# Patient Record
Sex: FEMALE
[De-identification: names, ages, dates, MRNs, and addresses within clinical notes are randomized per-mention and may not be internally consistent; named-entity substitution may affect disease eponyms.]

---

## 2019-04-22 ENCOUNTER — HOSPITAL ENCOUNTER (EMERGENCY)
Dept: HOSPITAL 14 - H.ER | Age: 69
Discharge: HOME | End: 2019-04-22
Payer: MEDICAID

## 2019-04-22 VITALS — TEMPERATURE: 98.6 F

## 2019-04-22 VITALS — DIASTOLIC BLOOD PRESSURE: 75 MMHG | SYSTOLIC BLOOD PRESSURE: 124 MMHG | RESPIRATION RATE: 18 BRPM

## 2019-04-22 VITALS — BODY MASS INDEX: 21.9 KG/M2

## 2019-04-22 DIAGNOSIS — M79.662: Primary | ICD-10-CM

## 2019-04-22 LAB
ALBUMIN SERPL-MCNC: 5 G/DL (ref 3.5–5)
ALBUMIN/GLOB SERPL: 1.1 {RATIO} (ref 1–2.1)
ALT SERPL-CCNC: 17 U/L (ref 9–52)
AST SERPL-CCNC: 29 U/L (ref 14–36)
BASOPHILS # BLD AUTO: 0.1 K/UL (ref 0–0.2)
BASOPHILS NFR BLD: 0.7 % (ref 0–2)
BUN SERPL-MCNC: 15 MG/DL (ref 7–17)
CALCIUM SERPL-MCNC: 10.2 MG/DL (ref 8.4–10.2)
EOSINOPHIL # BLD AUTO: 0 K/UL (ref 0–0.7)
EOSINOPHIL NFR BLD: 0.3 % (ref 0–4)
ERYTHROCYTE [DISTWIDTH] IN BLOOD BY AUTOMATED COUNT: 15.8 % (ref 11.5–14.5)
GFR NON-AFRICAN AMERICAN: > 60
HGB BLD-MCNC: 13.1 G/DL (ref 12–16)
LYMPHOCYTES # BLD AUTO: 2.1 K/UL (ref 1–4.3)
LYMPHOCYTES NFR BLD AUTO: 26.4 % (ref 20–40)
MCH RBC QN AUTO: 27.7 PG (ref 27–31)
MCHC RBC AUTO-ENTMCNC: 33.4 G/DL (ref 33–37)
MCV RBC AUTO: 82.9 FL (ref 81–99)
MONOCYTES # BLD: 0.4 K/UL (ref 0–0.8)
MONOCYTES NFR BLD: 5.2 % (ref 0–10)
NEUTROPHILS # BLD: 5.4 K/UL (ref 1.8–7)
NEUTROPHILS NFR BLD AUTO: 67.4 % (ref 50–75)
NRBC BLD AUTO-RTO: 0.2 % (ref 0–0)
PLATELET # BLD: 277 K/UL (ref 130–400)
PMV BLD AUTO: 8.2 FL (ref 7.2–11.7)
RBC # BLD AUTO: 4.72 MIL/UL (ref 3.8–5.2)
WBC # BLD AUTO: 8 K/UL (ref 4.8–10.8)

## 2019-04-22 NOTE — ED PDOC
Lower Extremity Pain/Injury


Time Seen by Provider: 19 11:00


Chief Complaint (Nursing): Lower Extremity Problem/Injury


Chief Complaint (Provider): Left leg pain


History Per: Patient,  (NAM [id number])


History/Exam Limitations: no limitations


Onset/Duration Of Symptoms: Days (3)


Current Symptoms Are (Timing): Still Present


Additional History Per: Patient


Additional Complaint(s): 


68yo female, otherwise well, comes to ER reporting worsening left lower leg pain

for the past 3 days. She states the pain was initially like a cramp but has 

progressively worsened; she took Tylenol at home with minimal relief. She denies

any fall, trauma, rash, fever, chills, chest pain or shortness of breath. 

Patient also denies any long travels or history of DVT. She states she does not 

follow up with a doctor - has one but hasnt followed up. no PE risk factors.





Past Medical History


Reviewed: Historical Data, Nursing Documentation, Vital Signs


Vital Signs: 





                                Last Vital Signs











Temp  98.6 F   19 10:59


 


Pulse  113 H  19 11:15


 


Resp  20   19 10:59


 


BP  188/91 H  19 10:59


 


Pulse Ox  98   19 10:59














- Medical History


PMH: No Chronic Diseases





- Surgical History


Surgical History: No Surg Hx





- Family History


Family History: States: No Known Family Hx





- Social History


Current smoker - smoking cessation education provided: No


Alcohol: None


Drugs: Denies





- Allergies


Allergies/Adverse Reactions: 


                                    Allergies











Allergy/AdvReac Type Severity Reaction Status Date / Time


 


No Known Allergies Allergy   Verified 19 11:12














Review of Systems


ROS Statement: Except As Marked, All Systems Reviewed And Found Negative


Constitutional: Negative for: Fever, Chills


Cardiovascular: Negative for: Chest Pain


Respiratory: Negative for: Shortness of Breath


Musculoskeletal: Positive for: Leg Pain


Neurological: Negative for: Weakness, Numbness





Physical Exam





- Reviewed


Nursing Documentation Reviewed: Yes


Vital Signs Reviewed: Yes





- Physical Exam


Appears: Positive for: Non-toxic, No Acute Distress


Head Exam: Positive for: ATRAUMATIC, NORMAL INSPECTION, NORMOCEPHALIC


Skin: Positive for: Normal Color


Eye Exam: Positive for: Normal appearance, EOMI, PERRL


ENT: Positive for: Normal ENT Inspection


Neck: Positive for: Supple


Cardiovascular/Chest: Positive for: Regular Rate, Rhythm


Respiratory: Positive for: Normal Breath Sounds.  Negative for: Wheezing, 

Respiratory Distress


Gastrointestinal/Abdominal: Positive for: Bowel Sounds, Soft.  Negative for: 

Tenderness


Back: Positive for: Normal Inspection


Extremity: Positive for: Normal ROM (FROM of bilateral lower extremities), 

Capillary Refill (less than 2 sec, pulses 2+. neurovascular intact).  Negative 

for: Tenderness, Pedal Edema, Calf Tenderness, Deformity, Swelling


Neurological/Psych: Positive for: Awake, Alert, Oriented (x 3)





- Laboratory Results


Result Diagrams: 


                                 19 12:49





                                 19 12:49





- ECG


ECG: Positive for: Interpreted By Me, Viewed By Me


ECG Rhythm: Positive for: Sinus Tachycardia


Rate: 113


O2 Sat by Pulse Oximetry: 98 (RA)


Pulse Ox Interpretation: Normal





Medical Decision Making


Medical Decision Makinyo female with atraumatic left leg pain


VS reviewed, patient with elevated HR of 113 rule out dvt


Plan:


-- Labs


-- EKG


-- Motrin 600mg PO


-- US Duplex lower extremity





1216


US Duplex lower extremity Findings: 





There is normal flow, compressibility, and augmentation of the left common 

femoral, femoral, and popliteal veins. 





The left posterior tibial veins appear patent. 





Impression: 





No evidence of deep venous thrombosis in the left lower extremity.





1432


US shows no significant abnormality. Patient reports improvement in symptoms, 

stable for discharge however given slightly elevated ddimer instructed pt to 

follow up for repeat US in one week. pt reevaluated states no chest pa in or 

sob.


----

--------------------------------------------------------------------------------


-------------


Scribe Attestation:


Documented by Reyna Stanton, acting as a scribe for Christophe Walls MD.


Provider Scribe Attestation:


All medical record entries made by the Scribe were at my direction and 

personally dictated by me. I have reviewed the chart and agree that the record 

accurately reflects my personal performance of the history, physical exam, 

medical decision making, and the department course for this patient. I have also

personally directed, reviewed, and agree with the discharge instructions and d

isposition.





Disposition





- Clinical Impression


Clinical Impression: 


 Calf pain








- Patient ED Disposition


Is Patient to be Admitted: No


Counseled Patient/Family Regarding: Studies Performed, Diagnosis, Need For 

Followup





- Disposition


Disposition: Routine/Home


Disposition Time: 14:32


Condition: IMPROVED


Additional Instructions: 


follow up with your doctor in one week for repeat ultrasound. if you cant make 

an appointment then you can come to the ER


take tylenol or motrin as needed


return to the ED with any worsening or concerning symptoms


Instructions:  Muscle and Bone Pain (DC)


Forms:  [employer] (English), [facility] (Mozambican)


Print Language: Austrian

## 2019-04-22 NOTE — US
Left lower extremity ultrasound. 



Indication:leg pain



Technique: Duplex ultrasound evaluation of the left lower extremity 



Comparison: None available



Findings: 



There is normal flow, compressibility, and augmentation of the left 

common femoral, femoral, and popliteal veins. 



The left posterior tibial veins appear patent. 



Impression: 



No evidence of deep venous thrombosis in the left lower extremity.

## 2019-04-22 NOTE — CARD
--------------- APPROVED REPORT --------------





Date of service: 04/22/2019



EKG Measurement

Heart [id number]

[id number]

[id number]

[id number]

[id number]



<Conclusion>

Sinus tachycardia

Otherwise normal ECG

## 2019-04-23 VITALS — HEART RATE: 113 BPM | OXYGEN SATURATION: 98 %
